# Patient Record
Sex: FEMALE | Race: BLACK OR AFRICAN AMERICAN | NOT HISPANIC OR LATINO | Employment: OTHER | URBAN - METROPOLITAN AREA
[De-identification: names, ages, dates, MRNs, and addresses within clinical notes are randomized per-mention and may not be internally consistent; named-entity substitution may affect disease eponyms.]

---

## 2019-12-04 PROBLEM — D57.1 HB-SS DISEASE WITHOUT CRISIS: Status: ACTIVE | Noted: 2019-12-04

## 2019-12-05 PROBLEM — D57.1 SICKLE CELL ANEMIA: Status: ACTIVE | Noted: 2019-12-05

## 2020-01-02 PROBLEM — F11.90 CHRONIC, CONTINUOUS USE OF OPIOIDS: Status: ACTIVE | Noted: 2020-01-02

## 2020-01-02 PROBLEM — R06.02 SOB (SHORTNESS OF BREATH) ON EXERTION: Status: ACTIVE | Noted: 2020-01-02

## 2020-01-02 PROBLEM — Z51.5 PALLIATIVE CARE BY SPECIALIST: Status: ACTIVE | Noted: 2020-01-02

## 2020-01-02 PROBLEM — D57.1 SICKLE CELL ANEMIA WITHOUT CRISIS: Status: ACTIVE | Noted: 2020-01-02

## 2020-02-15 PROBLEM — Z92.89 HISTORY OF PARTIAL EXCHANGE TRANSFUSION: Status: ACTIVE | Noted: 2020-02-15

## 2020-05-09 PROBLEM — D64.9 ANEMIA REQUIRING TRANSFUSIONS: Status: ACTIVE | Noted: 2020-05-09

## 2020-06-25 PROBLEM — J96.11 CHRONIC RESPIRATORY FAILURE WITH HYPOXIA: Status: ACTIVE | Noted: 2020-06-25

## 2020-06-25 PROBLEM — D57.00 SICKLE CELL PAIN CRISIS: Status: ACTIVE | Noted: 2020-06-25

## 2020-06-27 PROBLEM — R52 PAIN MANAGEMENT: Status: ACTIVE | Noted: 2020-06-27

## 2020-06-29 PROBLEM — M54.2 NECK PAIN: Status: ACTIVE | Noted: 2020-06-29

## 2020-06-29 PROBLEM — D57.00 SICKLE CELL PAIN CRISIS: Status: RESOLVED | Noted: 2020-06-25 | Resolved: 2020-06-29

## 2020-07-15 PROBLEM — D57.01 ACUTE CHEST SYNDROME: Status: RESOLVED | Noted: 2020-07-15 | Resolved: 2020-07-15

## 2020-07-15 PROBLEM — D57.01 ACUTE CHEST SYNDROME: Status: ACTIVE | Noted: 2020-07-15

## 2020-07-15 PROBLEM — D57.01 ACUTE CHEST SYNDROME DUE TO SICKLE CELL CRISIS: Status: ACTIVE | Noted: 2020-07-15

## 2020-07-16 PROBLEM — E83.39 HYPOPHOSPHATEMIA: Status: ACTIVE | Noted: 2020-07-16

## 2020-07-17 PROBLEM — R74.8 ALKALINE PHOSPHATASE ELEVATION: Status: ACTIVE | Noted: 2020-07-17

## 2020-07-18 PROBLEM — R00.0 SINUS TACHYCARDIA: Status: ACTIVE | Noted: 2020-07-18

## 2020-07-19 PROBLEM — K59.03 CONSTIPATION DUE TO PAIN MEDICATION: Status: ACTIVE | Noted: 2020-07-19

## 2020-07-21 PROBLEM — K21.9 ACID REFLUX: Status: ACTIVE | Noted: 2020-07-21

## 2020-07-24 PROBLEM — N17.9 AKI (ACUTE KIDNEY INJURY): Status: ACTIVE | Noted: 2020-07-24

## 2020-07-25 PROBLEM — I82.409 ACUTE DVT (DEEP VENOUS THROMBOSIS): Status: ACTIVE | Noted: 2020-07-25

## 2020-07-25 PROBLEM — J96.11 CHRONIC RESPIRATORY FAILURE WITH HYPOXIA: Status: RESOLVED | Noted: 2020-06-25 | Resolved: 2020-07-25

## 2020-07-27 PROBLEM — J18.9 COMMUNITY ACQUIRED PNEUMONIA: Status: ACTIVE | Noted: 2020-07-27

## 2020-07-27 PROBLEM — J98.11 BILATERAL ATELECTASIS: Status: ACTIVE | Noted: 2020-07-27

## 2020-07-28 PROBLEM — D57.01 ACUTE CHEST SYNDROME DUE TO SICKLE CELL CRISIS: Status: RESOLVED | Noted: 2020-07-15 | Resolved: 2020-07-28

## 2020-07-29 PROBLEM — R00.0 SINUS TACHYCARDIA: Status: RESOLVED | Noted: 2020-07-18 | Resolved: 2020-07-29

## 2020-07-29 PROBLEM — E83.39 HYPOPHOSPHATEMIA: Status: RESOLVED | Noted: 2020-07-16 | Resolved: 2020-07-29

## 2020-07-29 PROBLEM — K59.03 CONSTIPATION DUE TO PAIN MEDICATION: Status: RESOLVED | Noted: 2020-07-19 | Resolved: 2020-07-29

## 2020-07-29 PROBLEM — R74.8 ALKALINE PHOSPHATASE ELEVATION: Status: RESOLVED | Noted: 2020-07-17 | Resolved: 2020-07-29

## 2020-08-11 PROBLEM — I82.432 ACUTE DEEP VEIN THROMBOSIS (DVT) OF POPLITEAL VEIN OF LEFT LOWER EXTREMITY: Status: ACTIVE | Noted: 2020-08-11

## 2020-08-11 PROBLEM — J96.01 ACUTE RESPIRATORY FAILURE WITH HYPOXIA: Status: ACTIVE | Noted: 2020-08-11

## 2020-08-11 PROBLEM — E87.5 HYPERKALEMIA: Status: ACTIVE | Noted: 2020-08-11

## 2020-08-11 PROBLEM — U07.1 COVID-19 VIRUS INFECTION: Status: ACTIVE | Noted: 2020-08-11

## 2020-08-11 PROBLEM — J12.82 PNEUMONIA DUE TO COVID-19 VIRUS: Status: ACTIVE | Noted: 2020-08-11

## 2020-08-11 PROBLEM — U07.1 PNEUMONIA DUE TO COVID-19 VIRUS: Status: ACTIVE | Noted: 2020-08-11

## 2020-08-12 PROBLEM — J96.01 ACUTE RESPIRATORY FAILURE WITH HYPOXIA: Status: ACTIVE | Noted: 2020-08-09

## 2020-08-12 PROBLEM — J96.01 ACUTE RESPIRATORY FAILURE WITH HYPOXIA: Status: RESOLVED | Noted: 2020-08-11 | Resolved: 2020-08-12

## 2020-08-12 PROBLEM — J96.21 ACUTE ON CHRONIC RESPIRATORY FAILURE WITH HYPOXIA: Status: ACTIVE | Noted: 2020-08-11

## 2020-08-12 PROBLEM — J96.21 ACUTE ON CHRONIC RESPIRATORY FAILURE WITH HYPOXIA: Status: ACTIVE | Noted: 2020-08-09

## 2020-08-12 PROBLEM — J96.21 ACUTE ON CHRONIC RESPIRATORY FAILURE WITH HYPOXIA: Status: RESOLVED | Noted: 2020-08-09 | Resolved: 2020-08-12

## 2020-08-13 ENCOUNTER — NURSE TRIAGE (OUTPATIENT)
Dept: ADMINISTRATIVE | Facility: CLINIC | Age: 40
End: 2020-08-13

## 2020-08-13 NOTE — TELEPHONE ENCOUNTER
Pt escalated due to no response. No answer with pt and ECx2- VM of pt and 1 EC full and unable to accept messages.       My chart message sent as per below.    We are contacting you from [facility]'s Covid-19 Surveillance Program, as we did not receive your symptoms and vital signs earlier today. We are calling to make sure that you are okay. If you need anything, please call [facility] ([phone]) to speak with one of our nurses. Thank you.      Reason for Disposition   No answer.  First attempt to contact caller.  Follow-up call scheduled within 15 minutes.     Attempted to reach pt on her and EC x 2 numbers. VM of pt and one EC full and unable to accept messages. Other EC no answer. My Chart message sent to pt.    Additional Information   Negative: Caller has already spoken with the PCP (or office), and has no further questions   Negative: Caller has already spoken with another triager and has no further questions   Negative: Caller has already spoken with another triager or PCP (or office), and has further questions and triager able to answer questions.   Negative: Busy signal.  First attempt to contact caller.  Follow-up call scheduled within 15 minutes.    Protocols used: NO CONTACT OR DUPLICATE CONTACT CALL-A-OH

## 2020-08-13 NOTE — TELEPHONE ENCOUNTER
Immediately after closing chart, pt entered data into my chart boy. Vitals and symptoms  Do not require additional escalation. No additional contact required per surveillance protocol.     Reason for Disposition   Caller has cancelled the call before the first contact     Pt entered vitals in immediately after closing chart. Vitals and symptoms reviewed and do not require second escalation. No additional contact required. Will continue to monitor.    Protocols used: NO CONTACT OR DUPLICATE CONTACT CALL-A-OH

## 2020-08-13 NOTE — TELEPHONE ENCOUNTER
Called patient to review COVID-19 Surveillance Program enrollment process.    Smartphone: yes    [facility]: yes    Program consent: yes    Pulse oximeter status: yes    Verified emergency contacts: yes    Program Overview: Reviewed , no response process, and importance of correct emergency contacts in event that well-being check is warranted. Encouraged patient to call [phone] 24/7 to speak with an [facility] nurse, if needed.    Patient had no further questions.

## 2020-08-13 NOTE — TELEPHONE ENCOUNTER
Contacted pt for enrollment in Covid Surveillance program. Pt verified name and . Pt has [employer] boy and was able to submit consent. Pt has pulse ox. Verified contact and updated emergency contact info. Went over surveillance program. Pt was able to submit VS and symptom data into [employer] All VS and symptoms stable and did not require further triage at this time. Pt was discharged on 2L of home O2. Initially pt checked O2 sat without wearing home O2. Satting at 92% and would not go up after a couple of deep breaths. Had pt put on home O2 and sats went up to 95%. Recommended pt continue to use home O2 as recommended by discharge notes. Advised to frequently monitor sat levels and if falls below 90 to turn up O2 and call 911 or go to ED. Gave number to [facility] if symptoms worsen or any further questions. Pt has no further questions at this time. Will continue to monitor.     Reason for Disposition   Health Information question, no triage required and triager able to answer question    Protocols used: INFORMATION ONLY CALL - NO TRIAGE-A-

## 2020-08-15 ENCOUNTER — NURSE TRIAGE (OUTPATIENT)
Dept: ADMINISTRATIVE | Facility: CLINIC | Age: 40
End: 2020-08-15

## 2020-08-15 NOTE — TELEPHONE ENCOUNTER
Patient initially escalated due to no response, however patient entered vitals prior to phone call. Vital signs and symptoms did not trigger a second escalation; therefore, no follow up call is needed at this time.      Additional Information   Negative: Caller has already spoken with the PCP (or office), and has no further questions   Negative: Caller has already spoken with another triager and has no further questions   Negative: Caller has already spoken with another triager or PCP (or office), and has further questions and triager able to answer questions.   Negative: Busy signal.  First attempt to contact caller.  Follow-up call scheduled within 15 minutes.   Negative: No answer.  First attempt to contact caller.  Follow-up call scheduled within 15 minutes.   Negative: Message left on identified voicemail   Negative: Message left on unidentified voice mail. Phone number verified.   Negative: Message left with person in household   Negative: Wrong number reached. Phone number verified.   Negative: Second attempt to contact family AND no contact made. Phone number verified.   Negative: Cell phone out of range. Phone number verified.   Negative: Patient already left for the hospital/clinic   Negative: Caller has cancelled the call before the first contact   Negative: Unable to complete triage due to phone connection issues    Protocols used: NO CONTACT OR DUPLICATE CONTACT CALL-A-OH

## 2020-08-16 ENCOUNTER — NURSE TRIAGE (OUTPATIENT)
Dept: ADMINISTRATIVE | Facility: CLINIC | Age: 40
End: 2020-08-16

## 2020-08-16 NOTE — TELEPHONE ENCOUNTER
Covid-19 surveillance escalation due to no response after 3 pm push notification. Follow up calls attempted. No contact made. Unable to leave messages. Sent [employer] message.    Reason for Disposition   No answer.  First attempt to contact caller.  Follow-up call scheduled within 15 minutes.    Additional Information   Negative: Caller is angry or rude (e.g., hangs up, verbally abusive, yelling)   Negative: Caller hangs up   Negative: Caller has already spoken with the PCP and has no further questions.   Negative: Caller has already spoken with another triager and has no further questions.   Negative: Caller has already spoken with another triager or PCP AND has further questions AND triager able to answer questions.   Negative: Busy signal.  First attempt to contact caller.  Follow-up call scheduled within 15 minutes.    Protocols used: NO CONTACT OR DUPLICATE CONTACT CALL-A-

## 2020-08-16 NOTE — TELEPHONE ENCOUNTER
No Response to Care Companion and normal vitals entered within window (ie no RN call). Patient initially escalated due to no response, however patient entered vitals prior to phone call. Vital signs and symptoms did not trigger a second escalation; no follow up call is needed at this time.     Additional Information   Negative: Caller is angry or rude (e.g., hangs up, verbally abusive, yelling)   Negative: Caller hangs up   Negative: Caller has already spoken with the PCP and has no further questions.   Negative: Caller has already spoken with another triager and has no further questions.   Negative: Caller has already spoken with another triager or PCP AND has further questions AND triager able to answer questions.   Negative: Busy signal.  First attempt to contact caller.  Follow-up call scheduled within 15 minutes.   Negative: No answer.  First attempt to contact caller.  Follow-up call scheduled within 15 minutes.   Negative: Message left on identified voice mail   Negative: Message left on unidentified voice mail.  Phone number verified.   Negative: Message left with person in household.   Negative: Wrong number reached.  Phone number verified.   Negative: Second attempt to contact family AND no contact made.  Phone number verified.   Negative: Cell phone out of range.  Phone number verified.   Negative: Pager number given.  Answering service notified.   Negative: Patient already left for the hospital/clinic.   Negative: Caller has cancelled the call before the first contact   Negative: Unable to complete triage due to phone connection issues   Negative: NON-URGENT call redirected to PCP's office because it is open    Protocols used: NO CONTACT OR DUPLICATE CONTACT CALL-A-

## 2020-08-17 ENCOUNTER — NURSE TRIAGE (OUTPATIENT)
Dept: ADMINISTRATIVE | Facility: CLINIC | Age: 40
End: 2020-08-17

## 2020-08-17 NOTE — TELEPHONE ENCOUNTER
Patient initially escalated due to no response, however patient entered vitals prior to phone call. Vital signs and symptoms did not trigger a second escalation; therefore, no follow up call is needed at this time.      Reason for Disposition   Health Information question, no triage required and triager able to answer question    Additional Information   Negative: [1] Caller is not with the adult (patient) AND [2] reporting urgent symptoms   Negative: Lab result questions   Negative: Medication questions   Negative: Caller can't be reached by phone   Negative: Caller has already spoken to PCP or another triager   Negative: RN needs further essential information from caller in order to complete triage   Negative: Requesting regular office appointment   Negative: [1] Caller requesting NON-URGENT health information AND [2] PCP's office is the best resource    Protocols used: INFORMATION ONLY CALL - NO TRIAGE-A-

## 2020-08-18 ENCOUNTER — NURSE TRIAGE (OUTPATIENT)
Dept: ADMINISTRATIVE | Facility: CLINIC | Age: 40
End: 2020-08-18

## 2020-08-18 NOTE — TELEPHONE ENCOUNTER
Patient initially escalated due to no response, however patient entered vitals prior to phone call. Vital signs and symptoms did not trigger a second escalation; therefore, no follow up call is needed at this time.  Reason for Disposition   Unable to complete triage due to phone connection issues    Additional Information   Negative: Caller is angry or rude (e.g., hangs up, verbally abusive, yelling)   Negative: Caller hangs up   Negative: Caller has already spoken with the PCP and has no further questions.   Negative: Caller has already spoken with another triager and has no further questions.   Negative: Caller has already spoken with another triager or PCP AND has further questions AND triager able to answer questions.   Negative: Busy signal.  First attempt to contact caller.  Follow-up call scheduled within 15 minutes.   Negative: No answer.  First attempt to contact caller.  Follow-up call scheduled within 15 minutes.   Negative: Message left on identified voice mail   Negative: Message left on unidentified voice mail.  Phone number verified.   Negative: Message left with person in household.   Negative: Wrong number reached.  Phone number verified.   Negative: Second attempt to contact family AND no contact made.  Phone number verified.   Negative: Cell phone out of range.  Phone number verified.   Negative: Pager number given.  Answering service notified.   Negative: Patient already left for the hospital/clinic.   Negative: Caller has cancelled the call before the first contact   Negative: NON-URGENT call redirected to PCP's office because it is open    Protocols used: NO CONTACT OR DUPLICATE CONTACT CALL-A-

## 2020-08-18 NOTE — TELEPHONE ENCOUNTER
Patient initially escalated due to no response, however patient entered vitals prior to phone call. Vital signs and symptoms did not trigger a second escalation; therefore, no follow up call is needed at this time.

## 2020-08-19 ENCOUNTER — NURSE TRIAGE (OUTPATIENT)
Dept: ADMINISTRATIVE | Facility: CLINIC | Age: 40
End: 2020-08-19

## 2020-08-20 ENCOUNTER — NURSE TRIAGE (OUTPATIENT)
Dept: ADMINISTRATIVE | Facility: CLINIC | Age: 40
End: 2020-08-20

## 2020-08-20 NOTE — TELEPHONE ENCOUNTER
Patient initially escalated due to no response, however patient entered vitals prior to phone call. Vital signs and symptoms did not trigger a second escalation; therefore, no follow up call is needed at this time.  Reason for Disposition   Caller has cancelled the call before the first contact    Protocols used: NO CONTACT OR DUPLICATE CONTACT CALL-A-OH

## 2020-08-21 ENCOUNTER — NURSE TRIAGE (OUTPATIENT)
Dept: ADMINISTRATIVE | Facility: CLINIC | Age: 40
End: 2020-08-21

## 2020-08-21 NOTE — TELEPHONE ENCOUNTER
Patient initially escalated due to no response, however patient entered vitals prior to phone call. Vital signs and symptoms did not trigger a second escalation; therefore, no follow up call is needed at this time.    Reason for Disposition   Caller has already spoken with another triager and has no further questions.    Protocols used: NO CONTACT OR DUPLICATE CONTACT CALL-A-AH

## 2020-08-22 ENCOUNTER — NURSE TRIAGE (OUTPATIENT)
Dept: ADMINISTRATIVE | Facility: CLINIC | Age: 40
End: 2020-08-22

## 2020-08-23 ENCOUNTER — NURSE TRIAGE (OUTPATIENT)
Dept: ADMINISTRATIVE | Facility: CLINIC | Age: 40
End: 2020-08-23

## 2020-08-25 ENCOUNTER — NURSE TRIAGE (OUTPATIENT)
Dept: ADMINISTRATIVE | Facility: CLINIC | Age: 40
End: 2020-08-25

## 2020-08-25 NOTE — TELEPHONE ENCOUNTER
Covid Home Surveillance   Vitals O2  97 HR 85 Temp 97.9    Fever Symptomns: No  Cough: No  SOB at rest: 0  SOB with activity: 0    Pt escalated through the Covid Home Surveillance program d/t no response.  Pt entered her v/s right late. All VSS.  Pt denies fever, cough or any SOB at this time. No contact made with this pt.  Per protocol, no additional action needed at this time.  Will continue to monitor at next push notification    Additional Information   Negative: Caller has already spoken with the PCP (or office), and has no further questions   Negative: Caller has already spoken with another triager and has no further questions   Negative: Caller has already spoken with another triager or PCP (or office), and has further questions and triager able to answer questions.   Negative: Busy signal.  First attempt to contact caller.  Follow-up call scheduled within 15 minutes.   Negative: No answer.  First attempt to contact caller.  Follow-up call scheduled within 15 minutes.   Negative: Message left on identified voicemail   Negative: Message left on unidentified voice mail. Phone number verified.   Negative: Message left with person in household   Negative: Wrong number reached. Phone number verified.   Negative: Second attempt to contact family AND no contact made. Phone number verified.   Negative: Cell phone out of range. Phone number verified.   Negative: Patient already left for the hospital/clinic   Negative: Caller has cancelled the call before the first contact   Negative: Unable to complete triage due to phone connection issues    Protocols used: NO CONTACT OR DUPLICATE CONTACT CALL-A-OH

## 2021-02-11 PROBLEM — E87.5 HYPERKALEMIA: Status: RESOLVED | Noted: 2020-08-11 | Resolved: 2021-02-11

## 2021-02-11 PROBLEM — Z86.19 HISTORY OF HEPATITIS C: Status: ACTIVE | Noted: 2021-02-11

## 2021-02-11 PROBLEM — N17.9 AKI (ACUTE KIDNEY INJURY): Status: RESOLVED | Noted: 2020-07-24 | Resolved: 2021-02-11

## 2021-10-21 PROBLEM — E83.19 IRON OVERLOAD: Status: ACTIVE | Noted: 2021-10-21

## 2021-10-21 PROBLEM — Z79.891 LONG TERM (CURRENT) USE OF OPIATE ANALGESIC: Status: ACTIVE | Noted: 2021-10-21

## 2024-05-02 ENCOUNTER — SOCIAL WORK (OUTPATIENT)
Dept: ADMINISTRATIVE | Facility: OTHER | Age: 44
End: 2024-05-02

## 2024-05-02 NOTE — PROGRESS NOTES
Was asked by  to assist pt with a gas card. Provided pt with 1/$20.00 gas card-([phone]) from the [employer] Gas cards program to assist with transportation. No other needs were noted at that time. Will continue to follow pt and assist.       Lucas Parnell LMSW    Ext [phone]/Pager [phone]

## 2024-11-22 ENCOUNTER — PATIENT MESSAGE (OUTPATIENT)
Dept: RESEARCH | Facility: HOSPITAL | Age: 44
End: 2024-11-22